# Patient Record
Sex: FEMALE | Race: OTHER | HISPANIC OR LATINO | URBAN - METROPOLITAN AREA
[De-identification: names, ages, dates, MRNs, and addresses within clinical notes are randomized per-mention and may not be internally consistent; named-entity substitution may affect disease eponyms.]

---

## 2020-11-17 ENCOUNTER — EMERGENCY (EMERGENCY)
Facility: HOSPITAL | Age: 45
LOS: 1 days | Discharge: HOME | End: 2020-11-17
Attending: EMERGENCY MEDICINE | Admitting: EMERGENCY MEDICINE
Payer: COMMERCIAL

## 2020-11-17 VITALS
OXYGEN SATURATION: 98 % | TEMPERATURE: 98 F | HEIGHT: 61 IN | SYSTOLIC BLOOD PRESSURE: 176 MMHG | HEART RATE: 105 BPM | WEIGHT: 158.07 LBS | DIASTOLIC BLOOD PRESSURE: 115 MMHG | RESPIRATION RATE: 18 BRPM

## 2020-11-17 VITALS
OXYGEN SATURATION: 98 % | DIASTOLIC BLOOD PRESSURE: 93 MMHG | TEMPERATURE: 99 F | RESPIRATION RATE: 18 BRPM | SYSTOLIC BLOOD PRESSURE: 152 MMHG | HEART RATE: 75 BPM

## 2020-11-17 LAB
ALBUMIN SERPL ELPH-MCNC: 4.6 G/DL — SIGNIFICANT CHANGE UP (ref 3.3–5)
ALP SERPL-CCNC: 84 U/L — SIGNIFICANT CHANGE UP (ref 40–120)
ALT FLD-CCNC: SIGNIFICANT CHANGE UP (ref 10–45)
ANION GAP SERPL CALC-SCNC: 14 MMOL/L — SIGNIFICANT CHANGE UP (ref 5–17)
APPEARANCE UR: CLEAR — SIGNIFICANT CHANGE UP
AST SERPL-CCNC: SIGNIFICANT CHANGE UP (ref 10–40)
BASOPHILS # BLD AUTO: 0.07 K/UL — SIGNIFICANT CHANGE UP (ref 0–0.2)
BASOPHILS NFR BLD AUTO: 0.9 % — SIGNIFICANT CHANGE UP (ref 0–2)
BILIRUB SERPL-MCNC: 0.2 MG/DL — SIGNIFICANT CHANGE UP (ref 0.2–1.2)
BILIRUB UR-MCNC: NEGATIVE — SIGNIFICANT CHANGE UP
BUN SERPL-MCNC: 18 MG/DL — SIGNIFICANT CHANGE UP (ref 7–23)
CALCIUM SERPL-MCNC: 10.3 MG/DL — SIGNIFICANT CHANGE UP (ref 8.4–10.5)
CHLORIDE SERPL-SCNC: 101 MMOL/L — SIGNIFICANT CHANGE UP (ref 96–108)
CO2 SERPL-SCNC: 23 MMOL/L — SIGNIFICANT CHANGE UP (ref 22–31)
COLOR SPEC: YELLOW — SIGNIFICANT CHANGE UP
CREAT SERPL-MCNC: 0.65 MG/DL — SIGNIFICANT CHANGE UP (ref 0.5–1.3)
DIFF PNL FLD: NEGATIVE — SIGNIFICANT CHANGE UP
EOSINOPHIL # BLD AUTO: 0.05 K/UL — SIGNIFICANT CHANGE UP (ref 0–0.5)
EOSINOPHIL NFR BLD AUTO: 0.7 % — SIGNIFICANT CHANGE UP (ref 0–6)
GLUCOSE SERPL-MCNC: 83 MG/DL — SIGNIFICANT CHANGE UP (ref 70–99)
GLUCOSE UR QL: NEGATIVE — SIGNIFICANT CHANGE UP
HCT VFR BLD CALC: 47.5 % — HIGH (ref 34.5–45)
HGB BLD-MCNC: 15.6 G/DL — HIGH (ref 11.5–15.5)
IMM GRANULOCYTES NFR BLD AUTO: 0.4 % — SIGNIFICANT CHANGE UP (ref 0–1.5)
KETONES UR-MCNC: NEGATIVE — SIGNIFICANT CHANGE UP
LEUKOCYTE ESTERASE UR-ACNC: NEGATIVE — SIGNIFICANT CHANGE UP
LYMPHOCYTES # BLD AUTO: 2.13 K/UL — SIGNIFICANT CHANGE UP (ref 1–3.3)
LYMPHOCYTES # BLD AUTO: 28.1 % — SIGNIFICANT CHANGE UP (ref 13–44)
MAGNESIUM SERPL-MCNC: 1.9 MG/DL — SIGNIFICANT CHANGE UP (ref 1.6–2.6)
MCHC RBC-ENTMCNC: 31.6 PG — SIGNIFICANT CHANGE UP (ref 27–34)
MCHC RBC-ENTMCNC: 32.8 GM/DL — SIGNIFICANT CHANGE UP (ref 32–36)
MCV RBC AUTO: 96.3 FL — SIGNIFICANT CHANGE UP (ref 80–100)
MONOCYTES # BLD AUTO: 0.51 K/UL — SIGNIFICANT CHANGE UP (ref 0–0.9)
MONOCYTES NFR BLD AUTO: 6.7 % — SIGNIFICANT CHANGE UP (ref 2–14)
NEUTROPHILS # BLD AUTO: 4.78 K/UL — SIGNIFICANT CHANGE UP (ref 1.8–7.4)
NEUTROPHILS NFR BLD AUTO: 63.2 % — SIGNIFICANT CHANGE UP (ref 43–77)
NITRITE UR-MCNC: NEGATIVE — SIGNIFICANT CHANGE UP
NRBC # BLD: 0 /100 WBCS — SIGNIFICANT CHANGE UP (ref 0–0)
PH UR: 6 — SIGNIFICANT CHANGE UP (ref 5–8)
PHOSPHATE SERPL-MCNC: 3.2 MG/DL — SIGNIFICANT CHANGE UP (ref 2.5–4.5)
PLATELET # BLD AUTO: 373 K/UL — SIGNIFICANT CHANGE UP (ref 150–400)
POTASSIUM SERPL-MCNC: SIGNIFICANT CHANGE UP (ref 3.5–5.3)
POTASSIUM SERPL-SCNC: SIGNIFICANT CHANGE UP (ref 3.5–5.3)
PROT SERPL-MCNC: 7.9 G/DL — SIGNIFICANT CHANGE UP (ref 6–8.3)
PROT UR-MCNC: NEGATIVE MG/DL — SIGNIFICANT CHANGE UP
RBC # BLD: 4.93 M/UL — SIGNIFICANT CHANGE UP (ref 3.8–5.2)
RBC # FLD: 13.2 % — SIGNIFICANT CHANGE UP (ref 10.3–14.5)
SARS-COV-2 RNA SPEC QL NAA+PROBE: SIGNIFICANT CHANGE UP
SODIUM SERPL-SCNC: 138 MMOL/L — SIGNIFICANT CHANGE UP (ref 135–145)
SP GR SPEC: <=1.005 — SIGNIFICANT CHANGE UP (ref 1–1.03)
T4 AB SER-ACNC: 6.74 UG/DL — SIGNIFICANT CHANGE UP (ref 3.17–11.72)
TROPONIN T SERPL-MCNC: <0.01 NG/ML — SIGNIFICANT CHANGE UP (ref 0–0.01)
TSH SERPL-MCNC: 0.98 UIU/ML — SIGNIFICANT CHANGE UP (ref 0.35–4.94)
UROBILINOGEN FLD QL: 0.2 E.U./DL — SIGNIFICANT CHANGE UP
WBC # BLD: 7.57 K/UL — SIGNIFICANT CHANGE UP (ref 3.8–10.5)
WBC # FLD AUTO: 7.57 K/UL — SIGNIFICANT CHANGE UP (ref 3.8–10.5)

## 2020-11-17 PROCEDURE — 70498 CT ANGIOGRAPHY NECK: CPT

## 2020-11-17 PROCEDURE — 99284 EMERGENCY DEPT VISIT MOD MDM: CPT | Mod: 25

## 2020-11-17 PROCEDURE — 81003 URINALYSIS AUTO W/O SCOPE: CPT

## 2020-11-17 PROCEDURE — 70496 CT ANGIOGRAPHY HEAD: CPT | Mod: 26

## 2020-11-17 PROCEDURE — 80053 COMPREHEN METABOLIC PANEL: CPT

## 2020-11-17 PROCEDURE — 84443 ASSAY THYROID STIM HORMONE: CPT

## 2020-11-17 PROCEDURE — 70450 CT HEAD/BRAIN W/O DYE: CPT | Mod: 26,59

## 2020-11-17 PROCEDURE — 84480 ASSAY TRIIODOTHYRONINE (T3): CPT

## 2020-11-17 PROCEDURE — 83735 ASSAY OF MAGNESIUM: CPT

## 2020-11-17 PROCEDURE — 70450 CT HEAD/BRAIN W/O DYE: CPT

## 2020-11-17 PROCEDURE — 36415 COLL VENOUS BLD VENIPUNCTURE: CPT

## 2020-11-17 PROCEDURE — 85025 COMPLETE CBC W/AUTO DIFF WBC: CPT

## 2020-11-17 PROCEDURE — 71046 X-RAY EXAM CHEST 2 VIEWS: CPT | Mod: 26

## 2020-11-17 PROCEDURE — 99285 EMERGENCY DEPT VISIT HI MDM: CPT

## 2020-11-17 PROCEDURE — 93005 ELECTROCARDIOGRAM TRACING: CPT

## 2020-11-17 PROCEDURE — 84100 ASSAY OF PHOSPHORUS: CPT

## 2020-11-17 PROCEDURE — 70498 CT ANGIOGRAPHY NECK: CPT | Mod: 26

## 2020-11-17 PROCEDURE — 71046 X-RAY EXAM CHEST 2 VIEWS: CPT

## 2020-11-17 PROCEDURE — 93010 ELECTROCARDIOGRAM REPORT: CPT

## 2020-11-17 PROCEDURE — 84436 ASSAY OF TOTAL THYROXINE: CPT

## 2020-11-17 PROCEDURE — 70496 CT ANGIOGRAPHY HEAD: CPT

## 2020-11-17 PROCEDURE — U0003: CPT

## 2020-11-17 PROCEDURE — 84484 ASSAY OF TROPONIN QUANT: CPT

## 2020-11-17 RX ORDER — SODIUM CHLORIDE 9 MG/ML
1000 INJECTION INTRAMUSCULAR; INTRAVENOUS; SUBCUTANEOUS ONCE
Refills: 0 | Status: COMPLETED | OUTPATIENT
Start: 2020-11-17 | End: 2020-11-17

## 2020-11-17 RX ADMIN — SODIUM CHLORIDE 1000 MILLILITER(S): 9 INJECTION INTRAMUSCULAR; INTRAVENOUS; SUBCUTANEOUS at 18:45

## 2020-11-17 NOTE — ED PROVIDER NOTE - PROVIDER TOKENS
PROVIDER:[TOKEN:[[id number]],FOLLOWUP:[1-3 Days]] PROVIDER:[TOKEN:[[id number]],FOLLOWUP:[1-3 Days]],PROVIDER:[TOKEN:[[id number]],FOLLOWUP:[4-6 Days]] PROVIDER:[TOKEN:[[id number]],FOLLOWUP:[1-3 Days]],PROVIDER:[TOKEN:[[id number]],FOLLOWUP:[4-6 Days]],PROVIDER:[TOKEN:[[id number]]]

## 2020-11-17 NOTE — ED ADULT NURSE NOTE - CHPI ED NUR SYMPTOMS NEG
no chills/no tingling/no dizziness/no decreased eating/drinking/no fever/no nausea/no vomiting/no weakness

## 2020-11-17 NOTE — ED PROVIDER NOTE - PATIENT PORTAL LINK FT
You can access the FollowMyHealth Patient Portal offered by [facility] by registering at the following website: [email or web address]. By joining [facility]’s FollowMyHealth portal, you will also be able to view your health information using other applications (apps) compatible with our system.

## 2020-11-17 NOTE — ED PROVIDER NOTE - OBJECTIVE STATEMENT
44 y/o F pt with PMhx of fibroids, migraines, and thyroid nodule, and PSHx of fibroid procedure 1.5 years ago presents to ED c/o lightheadedness, dizziness, headache, and palpitations for the past several weeks. Pt states she's been having her symptoms intermittently until 4 days ago when she was started on steroids, and her symptoms have worsened and been constant since. Pt follows a neurologist for her migraines and her lab results from 2.5 weeks ago showed elevated calcium of 10.5. Of note, pt had an MRI done 1.5 weeks ago of her head and L shoulder which was normal. Pt reports she hasn't followed up for her thyroid nodule for the past 1.5 years due to the doctor she was following quitting. Pt has chronic abdominal pain she attributes to her fibroids, but denies new abdominal pain, fevers, chills, chest pain, shortness of breath, nausea, vomiting, diarrhea, or any other acute complaints. 44 y/o F pt with PMhx of fibroids, migraines, and thyroid nodule, and PSHx of fibroid procedure 1.5 years ago presents to ED c/o lightheadedness, dizziness, headache, and palpitations for the past several weeks. Pt states she's been having her symptoms intermittently until 4 days ago when she was started on steroids, and her symptoms have worsened and been constant since. Pt follows a neurologist for her migraines and her lab results from 2.5 weeks ago showed elevated calcium of 10.5. Of note, pt had an MRI done 1.5 weeks ago of her head and L shoulder which was normal. Pt has chronic abdominal pain she attributes to her fibroids, but denies new abdominal pain, fevers, chills, chest pain, shortness of breath, nausea, vomiting, diarrhea, or any other acute complaints.

## 2020-11-17 NOTE — ED PROVIDER NOTE - ATTENDING CONTRIBUTION TO CARE
44 yo F with hx migraines and thyroid nodule with recurrent headaches and difficulty swallowing at times with voice changes over the past several months- no neck pain or focal weakness no tingling  CT /CTA  no vessel abn noted but possible  vocal cord paralysis (R) -  no masses noted on exam voice nl airway patent given ENT referral

## 2020-11-17 NOTE — ED PROVIDER NOTE - PHYSICAL EXAMINATION
General: Patient is well developed and well nourised. Patient is alert and oriented to person, place and date. Patient is sitting stretcher and appears in no acute distress.  HEENT: Head is normocephalic and atraumatic. Pupils are equal, round and reactive. Extraocular movements intact. No evidence of nystagmus, conjunctival injection, or scleral icterus. External ears symmetric without evidence of discharge.  Nose is symmetric, non-tender, patent without evidence of discharge. Teeth in good repair. Uvula midline.   Neck: Supple with no evidence of lymphadenopathy.  Full range of motion.  Heart: Regular rate and rhythm. No murmurs, rubs or gallops.   Lungs: Clear to auscultation bilaterally with equal chest expansion. No note of wheezes, rhonchi, rales. Equal chest expansion. No note of retractions.  Abdomen: Bowel sounds present in all four quadrants. Soft, non-tender, non-distended without signs of masses, rebound or guarding. No note of hepatosplenomegaly. No CVA tenderness bilaterally. Negative Salgado sign or McBurney's.  :   	MALE: no evidence or rashes, ulcers, nodules or scars. No evidence of discharge, scrotal masses or hernias  	Female: external genitalia without rashes, erythema, edema or ulcers. Vaginal mucosa pink and moist with clear vaginal discharge. No note of atrophy, erythema, ulcers, lesions, inflammation, abnormal discharge, nodules or masses in vaginal vault. Cervix is (punctate/stellate) without evidence of petichiae. Ovaries are non-palpable on physical exam.   Musculoskeletal: No edema, erythema, ecchymosis, atrophy or deformity. F No clubbing or cyanosis. No point tenderness to palpation.   Neuro: Cranial nerves II-XII intact. GCS 15. Moving all extremities. Strength is 5/5 arms and legs bilaterally. Sensation intact in extremities. gait steady   Skin: Warm, dry and intact without evidence of rashes, bruising, pallor, jaundice or cyanosis.   Psych: Mood and affect appropriate. General: Patient is well developed and well nourised. Patient is alert and oriented to person, place and date. Patient is sitting stretcher and appears in no acute distress.  HEENT: Head is normocephalic and atraumatic. Pupils are equal, round and reactive. Extraocular movements intact. No evidence of nystagmus, conjunctival injection, or scleral icterus. External ears symmetric without evidence of discharge.  Nose is symmetric, non-tender, patent without evidence of discharge. Teeth in good repair. Uvula midline.   Neck: Supple with no evidence of lymphadenopathy.  Full range of motion.  Heart: Regular rate and rhythm. No murmurs, rubs or gallops.   Lungs: Clear to auscultation bilaterally with equal chest expansion. No note of wheezes, rhonchi, rales. Equal chest expansion. No note of retractions.  Abdomen: Bowel sounds present in all four quadrants. Soft, non-tender, non-distended without signs of masses, rebound or guarding. No note of hepatosplenomegaly. No CVA tenderness bilaterally. Negative Salgado sign or McBurney's.  Neuro: Cranial nerves II-XII intact. GCS 15. Moving all extremities. Strength is 5/5 arms and legs bilaterally. Sensation intact in extremities. gait steady   Skin: Warm, dry and intact without evidence of rashes, bruising, pallor, jaundice or cyanosis.   Psych: Mood and affect appropriate.

## 2020-11-17 NOTE — ED ADULT TRIAGE NOTE - CHIEF COMPLAINT QUOTE
pt reports feeling jittery, lightheaded, intermittent palpitations x2 weeks. states she has seen a neurologist and told she has high calcium. denies cp/sob/fevers.

## 2020-11-17 NOTE — ED PROVIDER NOTE - CARE PROVIDERS DIRECT ADDRESSES
,[email or web address] ,[email or web address]

## 2020-11-17 NOTE — ED PROVIDER NOTE - CLINICAL SUMMARY MEDICAL DECISION MAKING FREE TEXT BOX
44 y/o F pt presents to ED with migraine, headache, jitteriness, lightheadedness, and palpitations for the past few weeks. Pt has been seen by neuro for chronic hx of migraines, had a normal MRI done 1.5weeks ago, but was found to have elevated calcium levels over 10. Pt denies fever, chills, chest pain, shortness of breath, nausea, vomiting, diarrhea, and changes in abdominal pain, but her symptoms worsened after taking a steroid for her headache. Pt's thyroid nodule hasn't been checked for the past 1.5years as well. Pt is well appearing and non-toxic on exam, but has BP of 161/125 with no hx of HTN. Plan for labs, meds, EKG, and reassess. 46 y/o F pt presents to ED with migraine, headache, jitteriness, lightheadedness, and palpitations for the past few weeks. Pt has been seen by neuro for chronic hx of migraines, had a normal MRI done 1.5weeks ago due to symptoms she has been experiencing, and incidentally was found to have elevated calcium levels over 10. Pt denies fever, chills, chest pain, shortness of breath, nausea, vomiting, diarrhea, and changes in abdominal pain, but her symptoms worsened after taking a steroid for her headache. Pt's thyroid nodule hasn't been checked for the past 1.5years as well. Pt is well appearing and non-toxic on exam, but has BP of 161/125 with no hx of HTN. Plan for labs, meds, EKG, and reassess. disposition pending ct read. pt and  agreeable to plan.

## 2020-11-17 NOTE — ED PROVIDER NOTE - CARE PLAN
Principal Discharge DX:	Acute nonintractable headache, unspecified headache type   IV discontinued, cath removed intact

## 2020-11-17 NOTE — ED ADULT NURSE NOTE - OBJECTIVE STATEMENT
pt reports feeling jittery, lightheaded with intermittent palpitations (feeling her "heart pounding") for 2 weeks. Pt states she has seen a neurologist and told she has high calcium. Pt also complains of pressure/pulsating headache that comes and goes as well. Pt was given decadron from the headache about 2 weeks ago.  Verbalizes mild/moderate relief. hx Migraine. Denies cp/sob/fevers. Pt also complains of having voice hoarseness that comes and goes.

## 2020-11-17 NOTE — ED PROVIDER NOTE - CARE PROVIDER_API CALL
Jean Garrison  NEUROLOGY  [address] [address]  Phone: [phone]  Fax: [phone]  Follow Up Time: 1-3 Days   Jean Garrison  NEUROLOGY  [address], [address] [place] [address]  Phone: [phone]  Fax: [phone]  Follow Up Time: 1-3 Days    Kenya Freed  OTOLARYNGOLOGY  [address] [place] [address]  Phone: [phone]  Fax: [phone]  Follow Up Time: 4-6 Days   Jean Garrison  NEUROLOGY  [address], [address] [place] [address]  Phone: [phone]  Fax: [phone]  Follow Up Time: 1-3 Days    Kenya Freed  OTOLARYNGOLOGY  [address] [place] [address]  Phone: [phone]  Fax: [phone]  Follow Up Time: 4-6 Days    Tess Lozano  MEDICINE  [address] [address]  Phone: [phone]  Fax: [phone]  Follow Up Time:

## 2020-11-18 PROBLEM — Z00.00 ENCOUNTER FOR PREVENTIVE HEALTH EXAMINATION: Status: ACTIVE | Noted: 2020-11-18

## 2020-11-18 LAB — T3 SERPL-MCNC: 89 NG/DL — SIGNIFICANT CHANGE UP (ref 80–200)

## 2020-11-19 PROBLEM — D21.9 BENIGN NEOPLASM OF CONNECTIVE AND OTHER SOFT TISSUE, UNSPECIFIED: Chronic | Status: ACTIVE | Noted: 2020-11-17

## 2020-11-19 PROBLEM — E04.1 NONTOXIC SINGLE THYROID NODULE: Chronic | Status: ACTIVE | Noted: 2020-11-17

## 2020-11-19 PROBLEM — G43.909 MIGRAINE, UNSPECIFIED, NOT INTRACTABLE, WITHOUT STATUS MIGRAINOSUS: Chronic | Status: ACTIVE | Noted: 2020-11-17

## 2020-11-20 ENCOUNTER — APPOINTMENT (OUTPATIENT)
Dept: OTOLARYNGOLOGY | Facility: CLINIC | Age: 45
End: 2020-11-20
Payer: COMMERCIAL

## 2020-11-20 VITALS
HEART RATE: 92 BPM | BODY MASS INDEX: 29.45 KG/M2 | SYSTOLIC BLOOD PRESSURE: 134 MMHG | WEIGHT: 156 LBS | TEMPERATURE: 96.6 F | HEIGHT: 61 IN | DIASTOLIC BLOOD PRESSURE: 93 MMHG

## 2020-11-20 DIAGNOSIS — Z80.9 FAMILY HISTORY OF MALIGNANT NEOPLASM, UNSPECIFIED: ICD-10-CM

## 2020-11-20 DIAGNOSIS — Z82.49 FAMILY HISTORY OF ISCHEMIC HEART DISEASE AND OTHER DISEASES OF THE CIRCULATORY SYSTEM: ICD-10-CM

## 2020-11-20 DIAGNOSIS — Z72.89 OTHER PROBLEMS RELATED TO LIFESTYLE: ICD-10-CM

## 2020-11-20 DIAGNOSIS — G43.909 MIGRAINE, UNSPECIFIED, NOT INTRACTABLE, W/OUT STATUS MIGRAINOSUS: ICD-10-CM

## 2020-11-20 PROCEDURE — 99204 OFFICE O/P NEW MOD 45 MIN: CPT | Mod: 25

## 2020-11-20 PROCEDURE — 99072 ADDL SUPL MATRL&STAF TM PHE: CPT

## 2020-11-20 PROCEDURE — 31575 DIAGNOSTIC LARYNGOSCOPY: CPT

## 2020-11-20 RX ORDER — OMEPRAZOLE 20 MG/1
20 CAPSULE, DELAYED RELEASE ORAL DAILY
Qty: 30 | Refills: 3 | Status: ACTIVE | COMMUNITY
Start: 2020-11-20 | End: 1900-01-01

## 2020-11-20 RX ORDER — FAMOTIDINE 20 MG/1
20 TABLET, FILM COATED ORAL
Qty: 30 | Refills: 2 | Status: ACTIVE | COMMUNITY
Start: 2020-11-20 | End: 1900-01-01

## 2020-11-21 DIAGNOSIS — R51.9 HEADACHE, UNSPECIFIED: ICD-10-CM

## 2020-11-21 DIAGNOSIS — Z20.828 CONTACT WITH AND (SUSPECTED) EXPOSURE TO OTHER VIRAL COMMUNICABLE DISEASES: ICD-10-CM

## 2020-11-21 DIAGNOSIS — R42 DIZZINESS AND GIDDINESS: ICD-10-CM

## 2020-11-21 DIAGNOSIS — R00.2 PALPITATIONS: ICD-10-CM

## 2020-11-21 DIAGNOSIS — R13.10 DYSPHAGIA, UNSPECIFIED: ICD-10-CM

## 2020-11-21 DIAGNOSIS — R49.9 UNSPECIFIED VOICE AND RESONANCE DISORDER: ICD-10-CM

## 2020-11-24 LAB
CALCIUM SERPL-MCNC: 10.1 MG/DL
PARATHYROID HORMONE INTACT: 47 PG/ML

## 2020-12-07 ENCOUNTER — OUTPATIENT (OUTPATIENT)
Dept: OUTPATIENT SERVICES | Facility: HOSPITAL | Age: 45
LOS: 1 days | End: 2020-12-07
Payer: COMMERCIAL

## 2020-12-07 ENCOUNTER — APPOINTMENT (OUTPATIENT)
Dept: ULTRASOUND IMAGING | Facility: HOSPITAL | Age: 45
End: 2020-12-07
Payer: COMMERCIAL

## 2020-12-07 PROCEDURE — 76536 US EXAM OF HEAD AND NECK: CPT

## 2020-12-07 PROCEDURE — 76536 US EXAM OF HEAD AND NECK: CPT | Mod: 26

## 2020-12-09 ENCOUNTER — APPOINTMENT (OUTPATIENT)
Dept: OTOLARYNGOLOGY | Facility: CLINIC | Age: 45
End: 2020-12-09
Payer: COMMERCIAL

## 2020-12-09 VITALS
HEIGHT: 61 IN | DIASTOLIC BLOOD PRESSURE: 95 MMHG | HEART RATE: 74 BPM | SYSTOLIC BLOOD PRESSURE: 154 MMHG | BODY MASS INDEX: 30.21 KG/M2 | TEMPERATURE: 97.1 F | WEIGHT: 160 LBS

## 2020-12-09 DIAGNOSIS — Z86.39 PERSONAL HISTORY OF OTHER ENDOCRINE, NUTRITIONAL AND METABOLIC DISEASE: ICD-10-CM

## 2020-12-09 DIAGNOSIS — J38.01 PARALYSIS OF VOCAL CORDS AND LARYNX, UNILATERAL: ICD-10-CM

## 2020-12-09 DIAGNOSIS — R49.9 UNSPECIFIED VOICE AND RESONANCE DISORDER: ICD-10-CM

## 2020-12-09 DIAGNOSIS — K21.9 GASTRO-ESOPHAGEAL REFLUX DISEASE W/OUT ESOPHAGITIS: ICD-10-CM

## 2020-12-09 DIAGNOSIS — E04.1 NONTOXIC SINGLE THYROID NODULE: ICD-10-CM

## 2020-12-09 PROBLEM — Z80.9 FAMILY HISTORY OF MALIGNANT NEOPLASM: Status: ACTIVE | Noted: 2020-11-20

## 2020-12-09 PROBLEM — Z80.9 FAMILY HISTORY OF CANCER: Status: ACTIVE | Noted: 2020-12-09

## 2020-12-09 PROCEDURE — 99072 ADDL SUPL MATRL&STAF TM PHE: CPT

## 2020-12-09 PROCEDURE — 31575 DIAGNOSTIC LARYNGOSCOPY: CPT

## 2020-12-09 PROCEDURE — 99214 OFFICE O/P EST MOD 30 MIN: CPT | Mod: 25

## 2020-12-09 RX ORDER — GALCANEZUMAB 100 MG/ML
100 INJECTION, SOLUTION SUBCUTANEOUS
Refills: 0 | Status: COMPLETED | COMMUNITY
End: 2020-12-09

## 2020-12-09 NOTE — PHYSICAL EXAM
[Midline] : trachea located in midline position [Normal] : no rashes [Laryngoscopy Performed] : laryngoscopy was performed, see procedure section for findings [FreeTextEntry1] : No distinct hoarseness today. [de-identified] : left neck tender to palpation; no mass [de-identified] : Thyroid gland mildly enlarged; no palpable nodules.  [de-identified] : Carotid pulses 2+ bilateral.

## 2020-12-09 NOTE — CONSULT LETTER
[Please see my note below.] : Please see my note below. [Consult Closing:] : Thank you very much for allowing me to participate in the care of this patient.  If you have any questions, please do not hesitate to contact me. [Sincerely,] : Sincerely, [Dear  ___] : Dear  [unfilled], [Courtesy Letter:] : I had the pleasure of seeing your patient, [unfilled], in my office today. [DrAng  ___] : Dr. PALMA [FreeTextEntry2] : Dexter Morin MD\par [address]\[place] [address] [FreeTextEntry3] : \par Kenya Freed MD \par Otolaryngology, Head and Neck Surgery \par \par

## 2020-12-09 NOTE — HISTORY OF PRESENT ILLNESS
[de-identified] : Ms. 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 Last biopsy was checked 3 years ago.\par She has had pain and discomfort in her throat that started 3 weeks ago. At that time her Neurologist checked her calcium level which was high at 10.5.\par She then went to the ER for heart palpitations and tremors after starting dexamethasone for migraine.  Her symptoms continued after stopping the dexamethasone.  In the ER she had a CT which indicated vocal cord palsy and a nodule on the vocal cord.\par She has had hoarseness for a few months now since she had strep throat 1 year ago. She gets fatigue when she talks a lot.  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   Does sing occasionally; now cant sing high notes.  Used to do a lot of karaoke. Never smoked.\par Has postnasal drip, constant dry throat and she is more sweaty lately.\par History of reflux, takes Pepcid or tums as needed.\par Denies runny nose, trouble swallowing, regurgitation, cough or difficulty breathing\par \par \par CT ANGIO NECK (W)AW IC and  CT ANGIO BRAIN (W)AW IC (11/17/2020) at [facility]:               \par - COMPARISON: CT head 11/17/20.\par -  The CTA exam of the [place] demonstrates the internal carotid arteries to be normal in caliber. There is a normal bifurcation into the A1 and M1 segments with symmetry of the anterior and middle cerebral arteries aside from mild hypoplasia of the right A1 segment. The visualized vertebral arteries appear normal in caliber. The basilar artery appears normal. There is a normal bifurcation into the posterior cerebral arteries. There are no areas of stenosis, dilatation or aneurysm. There is infundibular origin of the left P-comm (sagittal MIP image 34).\par - The CTA exam demonstrates the right common carotid artery to be normal in caliber. There is a normal bifurcation into the right internal and external carotid arteries. There is no hemodynamically significant stenosis.\par The left common carotid artery is normal in caliber. There is a normal bifurcation into the left internal and external carotid arteries. There is no hemodynamically significant stenosis.\par - The visualized vertebral arteries are normal in caliber, given some streak artifact limited view of the distal right vertebral artery V3 segment as seen on source series 10, images [id number].\par - The aortic arch appears intact without narrowing of the origin of the great vessels.\par - Asymmetry of the larynx is noted with medialized right aryepiglottic fold and dilated right piriform sinus. This may be related to patient positioning as opposed to findings that would indicate a right vocal palsy. No obvious mass is visualized along the course of the right recurrent laryngeal nerve, with small volume soft tissue at the right tracheoesophageal groove that likely reflects a variant tubercle of Zuck[place]andl.\par IMPRESSION:\par 1.) No intracranial arterial steno-occlusive disease.\par 2.) No carotid or vertebral artery steno-occlusive disease in the neck, given partial streak artifact limited view of the right vertebral artery V3 segment.\par 3.) Asymmetry of the right hemilarynx may be positional as opposed to findings of vocal paresis. Correlate for any hoarseness or dysphonia, and consider ENT referral if there is concern for vocal cord palsy.\par (Images were reviewed.) \par \par LABS \par (11/17/2020) - COVID PCR negative, WBC 7.57, Ca 10.3, Cr 0.65, TSH 0.978, T4 6.74 (NL)\par

## 2020-12-09 NOTE — ASSESSMENT
[FreeTextEntry1] : Ms. 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  The mobility change is very subtle but vocal fold is clearly foreshortened.  Voice sx following strep throat 1 year ago.  No clear etiology (no intubation, trauma, neck/chest surgery, other neuro problems except migraine) but will need to f/up with the thyroid nodules. \par --> Arrange for EMG larynx \par --> voice therapy and stroboscopy\par \par 2.) Hypercalcemia, with serum level 10.3 (still WNL) in ED\par --> Check PTH and Ca\par \par 3.) Reflux changes and inflammation by hx and on laryngeal exam\par --> start famotidine\par --> start omeprazole\par \par 4.) thyroid nodule(s)\par --> US thyroid and parathyroid \par \par Return after testing\par

## 2020-12-09 NOTE — PROCEDURE
[Image(s) Captured] : image(s) captured and filed [Video Captured] : video captured and filed [de-identified] : \par Indication: thyroid nodules\par -Verbal consent was obtained from patient prior to procedure.\par -Mathieu-Synephrine and lidocaine 2% spray applied to the nasal cavities.\par Flexible laryngoscopy was performed via right nostril and revealed the following:\par   -- Nasopharynx had no mass or exudate.\par   -- Base of tongue was symmetric and not enlarged.\par   -- Vallecula was clear\par   -- Epiglottis, both aryepiglottic folds and both false vocal folds were normal\par   -- Arytenoids both with mild edema and erythema \par   -- True vocal fold on left is fully mobile; on right seems mobile but length is shortened and the right piriform is dilated.  No vocal fold lesions. \par   -- Post cricoid area was clear.\par   -- Interarytenoid edema was present.\par \par The patient tolerated the procedure well.\par \par

## 2020-12-09 NOTE — REVIEW OF SYSTEMS
[Patient Intake Form Reviewed] : Patient intake form was reviewed [Post Nasal Drip] : post nasal drip [Vertigo] : vertigo [As Noted in HPI] : as noted in HPI [Hoarseness] : hoarseness [Throat Pain] : throat pain [Feeling Tired] : feeling tired [Red Eyes] : red eyes [Eyesight Problems] : eyesight problems [Palpitations] : palpitations [Abdominal Pain] : abdominal pain [Constipation] : constipation [Heartburn] : heartburn [Joint Pain] : joint pain [Joint Stiffness] : joint stiffness [Joint Swelling] : joint swelling [Easy Bruising] : a tendency for easy bruising [Swollen Glands] : swollen glands [Negative] : Genitourinary [de-identified] : headache [FreeTextEntry9] : muscle weakness, back pain [de-identified] : numbness. tingling, tremors, imbalance [de-identified] : cold intolerance, excess thirst, excess urination, enlarged thyroid, thyroid nodule, nail changes, thinning hair

## 2020-12-11 ENCOUNTER — NON-APPOINTMENT (OUTPATIENT)
Age: 45
End: 2020-12-11

## 2020-12-11 PROBLEM — R49.9 HOARSENESS OR CHANGING VOICE: Status: ACTIVE | Noted: 2020-11-20

## 2020-12-11 PROBLEM — K21.9 GASTROESOPHAGEAL REFLUX DISEASE: Status: ACTIVE | Noted: 2020-11-20

## 2020-12-11 PROBLEM — E04.1 THYROID NODULE: Status: ACTIVE | Noted: 2020-11-20

## 2020-12-11 PROBLEM — J38.01 VOCAL CORD PARALYSIS, UNILATERAL PARTIAL: Status: ACTIVE | Noted: 2020-11-20

## 2020-12-30 PROBLEM — Z86.39 HISTORY OF HYPERCALCEMIA: Status: RESOLVED | Noted: 2020-11-20 | Resolved: 2020-12-30

## 2020-12-30 NOTE — ASSESSMENT
[FreeTextEntry1] : Ms. Morales was evaluated for the following issues today:\par \par 1.) Thyroid nodules are multiple, subcm and have no suspicious features on US in this euthyroid woman\par --> US thyroid repeat in 1-2 years\par \par 2.) Hypercalcemia, with serum level 10.3  in ED\par  Repeat serum calcium was normal, as was intact PTH level\par \par 3.) Dysphonia, vocal strain and right unilateral TVF paresis.  The mobility change is very subtle but vocal fold is clearly foreshortened.  Voice sx following strep throat 1 year ago.  May be viral-related neuropathy since no clear etiology (no intubation, trauma, neck/chest surgery, other neuro problems except migraine).  NO lesion in course of recurrent laryngeal nerve on the CT scan\par -->  Referral to Dr. Kali Clarke, larygnologist.  She can get EMG larynx, stroboscopy and voice therapy through him\par \par 4.) Reflux changes and inflammation by hx and on laryngeal exam - slightly better\par --> continue famotidine and omeprazole\par \par

## 2020-12-30 NOTE — PHYSICAL EXAM
[FreeTextEntry1] : Mildly husky voice today. [de-identified] : Thyroid gland mildly enlarged; no palpable nodules.  [Midline] : trachea located in midline position [Laryngoscopy Performed] : laryngoscopy was performed, see procedure section for findings [Normal] : no neck adenopathy

## 2020-12-30 NOTE — HISTORY OF PRESENT ILLNESS
[de-identified] : Ms. Morales was seen in f/up for thyroid nodules, which she reports having for years.\par Had benign biopsies. Last biopsy was about 3 years ago.\par No FH thyroid cancer or hyperparathyroidism.\par New US  thyroid done.\par Also had labs to check for primary hyperparathyroidism since Ca was elevated last month --> Ca and intact PTH normal.\par \par She has been having migraines. \par She has been trying to talk less and lower. Had a presentation at work that strained her voice and made it worse. Voice is improving since she has been talking less.\par Taking reflux medications daily and still having throat pain.\par Didn't get speech therapy or EMG \par Still having postnasal drip \par \par VISIT 12/09/2020:\par She has had pain and discomfort in her throat that started 3 weeks ago. At that time her Neurologist checked her calcium level which was high at 10.5.\par She then went to the ER for heart palpitations and tremors after starting dexamethasone for migraine.  Her symptoms continued after stopping the dexamethasone.  In the ER she had a CT which indicated vocal cord palsy and a nodule on the vocal cord.\par She has had hoarseness for a few months now since she had strep throat 1 year ago. She gets fatigue when she talks a lot.  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   Does sing occasionally; now cant sing high notes.  Used to do a lot of karaoke. Never smoked.\par Has postnasal drip, constant dry throat and she is more sweaty lately.\par History of reflux, takes Pepcid or tums as needed.\par Denies runny nose, trouble swallowing, regurgitation, cough or difficulty breathing\par \par \par LABS \par (11/20/2020) - Intact PTH 47, calcium 10.1\par (11/17/2020) - COVID PCR negative, WBC 7.57, Ca 10.3, Cr 0.65, TSH 0.978, T4 6.74 (NL)\par \par \par US THYROID AND PARATHYROID  (12/07/2020) at [facility]: \par - Prior study: CTA neck dated 11/17/2020\par - RIGHT LOBE:  4.1 x 1.3 x 1.4 cm, homogeneous, normal vascularity; contains no visible nodules.\par - LEFT LOBE:   4.9 x 1.0 x 1.9 cm, homogeneous, normal vascularity; contains three visible nodules.\par    1.) 0.3 x 0.1 x 0.3 cm cyst in upper pole\par    2.)  0.6 x 0.4 x 0.4 cm predominantly solid, hypoechoic nodule  in upper pole\par    3.)   0.4 x 0.3 x 0.4 cm cystic nodule with eccentric solid areas in mid pole\par ISTHMUS:  0.3 cm and contains no visible nodules.\par CERVICAL LYMPH NODE EVALUATION (for any abnormal lymph node, please note the following: location, size, calcification, cystic area, absence of central hilum, round shape, abnormal blood flow):\par -  No abnormal lymph nodes are identified in the neck.\par PARATHYROID EXAMINATION:\par -  Parathyroid glands not visualized.\par IMPRESSION:\par 1.)  Multinodular thyroid. No nodules meet the threshold for fine needle aspiration at this time.\par 2.)  No enlarged parathyroid glands visualized.\par \par \par CT ANGIO NECK (W)AW IC and  CT ANGIO BRAIN (W)AW IC (11/17/2020) at [facility]:               \par - COMPARISON: CT head 11/17/20.\par -  The CTA exam of the [place] demonstrates the internal carotid arteries to be normal in caliber. There is a normal bifurcation into the A1 and M1 segments with symmetry of the anterior and middle cerebral arteries aside from mild hypoplasia of the right A1 segment. The visualized vertebral arteries appear normal in caliber. The basilar artery appears normal. There is a normal bifurcation into the posterior cerebral arteries. There are no areas of stenosis, dilatation or aneurysm. There is infundibular origin of the left P-comm (sagittal MIP image 34).\par - The CTA exam demonstrates the right common carotid artery to be normal in caliber. There is a normal bifurcation into the right internal and external carotid arteries. There is no hemodynamically significant stenosis.\par The left common carotid artery is normal in caliber. There is a normal bifurcation into the left internal and external carotid arteries. There is no hemodynamically significant stenosis.\par - The visualized vertebral arteries are normal in caliber, given some streak artifact limited view of the distal right vertebral artery V3 segment as seen on source series 10, images [id number].\par - The aortic arch appears intact without narrowing of the origin of the great vessels.\par - Asymmetry of the larynx is noted with medialized right aryepiglottic fold and dilated right piriform sinus. This may be related to patient positioning as opposed to findings that would indicate a right vocal palsy. No obvious mass is visualized along the course of the right recurrent laryngeal nerve, with small volume soft tissue at the right tracheoesophageal groove that likely reflects a variant tubercle of Zuckerkandl.\par IMPRESSION:\par 1.) No intracranial arterial steno-occlusive disease.\par 2.) No carotid or vertebral artery steno-occlusive disease in the neck, given partial streak artifact limited view of the right vertebral artery V3 segment.\par 3.) Asymmetry of the right hemilarynx may be positional as opposed to findings of vocal paresis. Correlate for any hoarseness or dysphonia, and consider ENT referral if there is concern for vocal cord palsy.\par \par \par

## 2020-12-30 NOTE — PROCEDURE
[Image(s) Captured] : image(s) captured and filed [Video Captured] : video captured and filed [de-identified] : \par Indication:  hoarseness\par -Verbal consent was obtained from patient prior to procedure.\par -Mathieu-Synephrine and lidocaine 2% spray applied to the nasal cavities.\par Flexible laryngoscopy was performed via right nostril and revealed the following:\par   -- Nasopharynx had no mass.  White mucus in posterior choanae.\par   -- Base of tongue was symmetric and not enlarged.\par   -- Vallecula was clear.  Cobblestoning posterior pharyngeal wall. \par   -- Epiglottis, both aryepiglottic folds and both false vocal folds were normal\par   -- Arytenoids both with mild-moderate edema and mild erythema \par   -- True vocal fold on left is fully mobile; on right seems mobile but length is shortened and the right piriform is dilated.  No vocal fold lesions. Exam of TVFs is unchanged\par   -- Post cricoid area was clear.\par   -- Interarytenoid edema was present.\par \par The patient tolerated the procedure well.\par \par

## 2021-01-13 ENCOUNTER — APPOINTMENT (OUTPATIENT)
Dept: ENDOCRINOLOGY | Facility: CLINIC | Age: 46
End: 2021-01-13

## 2021-05-19 ENCOUNTER — APPOINTMENT (OUTPATIENT)
Dept: OTOLARYNGOLOGY | Facility: CLINIC | Age: 46
End: 2021-05-19

## 2021-07-22 ENCOUNTER — APPOINTMENT (OUTPATIENT)
Dept: OTOLARYNGOLOGY | Facility: CLINIC | Age: 46
End: 2021-07-22

## 2022-09-08 NOTE — ED ADULT TRIAGE NOTE - PRO INTERPRETER NEED 2
Fasting Future Labs  Please come A FEW DAYS PRIOR TO NEXT VISIT  Please come fasting (at least 8 hours) to recheck labs.  Please drink plenty of water prior.  You can take any prescribed or routine medicine with water prior.  You can brush your teeth even if you are fasting.     English

## 2024-12-19 NOTE — ED ADULT TRIAGE NOTE - HEIGHT IN CM
Subjective   Patient ID: Luis Antonio Clark is a 36 y.o. female who presents for blood pressure follow up.  HPI  Since last visit patient has seen cardiology.  Review of note dated 12/4/2024 states blood pressure had been going up she was advised increase losartan 50 mg a day.  DEBORAH being managed on CPAP.  Blood pressure at the time of evaluation at cardiologist office 142/90.  Otherwise she feels with change of med i.e. increasing dose much better control.  She remains on Normodyne    200 mg twice daily  Review of Systems  All other  pertinent  systems reviewed and are negative except  those  mentioned  in HPI   Objective   Physical Exam  general: alert oriented x three  HEENT hearing normal to voice  Neck supple  Lungs respirations non-labored.  Cardiovascular: no peripheral edema  Skin: warm and dry without rash  Psych: judgement and insight normal  Musculoskeletal:  ambulation normal,    lymph:negative cervical  LYMPADENOPATHY  thyroid: non palpable enlargement   Labs        Assessment/Plan   Problem List Items Addressed This Visit       Hypertension - Primary     Since last visit patient has seen cardiology.  Review of note dated 12/4/2024 states blood pressure had been going up she was advised increase losartan 50 mg a day.  DEBORAH being managed on CPAP.  Blood pressure at the time of evaluation at cardiologist office 142/90.  Otherwise she feels with change of med i.e. increasing dose much better control.  She remains on Normodyne    200 mg twice daily  stable and continue meds          Relevant Medications    losartan (Cozaar) 50 mg tablet    Other Relevant Orders    Basic Metabolic Panel                  154.94